# Patient Record
Sex: FEMALE | Race: BLACK OR AFRICAN AMERICAN | NOT HISPANIC OR LATINO | URBAN - METROPOLITAN AREA
[De-identification: names, ages, dates, MRNs, and addresses within clinical notes are randomized per-mention and may not be internally consistent; named-entity substitution may affect disease eponyms.]

---

## 2022-10-05 ENCOUNTER — TELEPHONE (OUTPATIENT)
Dept: FAMILY MEDICINE CLINIC | Facility: CLINIC | Age: 22
End: 2022-10-05

## 2022-10-05 NOTE — TELEPHONE ENCOUNTER
10/05/22      Called pt and No Answer  Left Detailed Message  If pt contacts office, please assist pt with confirming appt on 10/06/22 or if pt request to reschedule or cancel

## 2022-10-06 ENCOUNTER — OFFICE VISIT (OUTPATIENT)
Dept: FAMILY MEDICINE CLINIC | Facility: CLINIC | Age: 22
End: 2022-10-06

## 2022-10-06 VITALS
OXYGEN SATURATION: 99 % | HEART RATE: 83 BPM | TEMPERATURE: 96.6 F | BODY MASS INDEX: 24.11 KG/M2 | DIASTOLIC BLOOD PRESSURE: 72 MMHG | HEIGHT: 66 IN | SYSTOLIC BLOOD PRESSURE: 118 MMHG | RESPIRATION RATE: 16 BRPM | WEIGHT: 150 LBS

## 2022-10-06 DIAGNOSIS — Z11.59 ENCOUNTER FOR HEPATITIS C SCREENING TEST FOR LOW RISK PATIENT: ICD-10-CM

## 2022-10-06 DIAGNOSIS — Z11.4 ENCOUNTER FOR SCREENING FOR HIV: ICD-10-CM

## 2022-10-06 DIAGNOSIS — R73.03 PREDIABETES: ICD-10-CM

## 2022-10-06 DIAGNOSIS — Z23 ENCOUNTER FOR IMMUNIZATION: ICD-10-CM

## 2022-10-06 DIAGNOSIS — Z83.42 FAMILY HISTORY OF HIGH CHOLESTEROL: ICD-10-CM

## 2022-10-06 DIAGNOSIS — Z00.00 HEALTHCARE MAINTENANCE: ICD-10-CM

## 2022-10-06 DIAGNOSIS — Z00.00 ANNUAL PHYSICAL EXAM: Primary | ICD-10-CM

## 2022-10-06 DIAGNOSIS — Z83.3 FAMILY HISTORY OF DIABETES MELLITUS: ICD-10-CM

## 2022-10-06 LAB — SL AMB POCT HEMOGLOBIN AIC: 5.9 (ref ?–6.5)

## 2022-10-06 PROCEDURE — 99385 PREV VISIT NEW AGE 18-39: CPT | Performed by: FAMILY MEDICINE

## 2022-10-06 PROCEDURE — 90686 IIV4 VACC NO PRSV 0.5 ML IM: CPT | Performed by: FAMILY MEDICINE

## 2022-10-06 PROCEDURE — 83036 HEMOGLOBIN GLYCOSYLATED A1C: CPT | Performed by: FAMILY MEDICINE

## 2022-10-06 PROCEDURE — 90471 IMMUNIZATION ADMIN: CPT | Performed by: FAMILY MEDICINE

## 2022-10-06 NOTE — LETTER
October 6, 2022     Patient: Maryam Singletary  YOB: 2000  Date of Visit: 10/6/2022      To Whom it May Concern:    Maryam Singletary is under my professional care  Quentindeja Vinita was seen in my office on 10/6/2022  Wilianjayasaloni received the Flu vaccination today  If you have any questions or concerns, please don't hesitate to call           Sincerely,          Kali Randall MD        CC: No Recipients

## 2022-10-06 NOTE — ASSESSMENT & PLAN NOTE
· Flu shot given 10/6/2022  · Discussed cervical cancer screening and Pap smear starting at the age of 24  · Discussed risks/benefits of HPV vaccination  Advised patient will be a 3 vaccination series  She states that she will discuss with her family members and make a nursing visit appointment if interested in the future  · Do not have vaccination record on file however believe she is up-to-date on her tetanus vaccinations so will hold off at this time  · HIV/hepatitis-C blood test ordered    Advised to hold off on obtaining these tests at this time until she gets health insurance where these test will be covered  · Educated the importance of biannual dental examinations including  · Work note provided prove for influenza vaccination today

## 2022-10-06 NOTE — PATIENT INSTRUCTIONS

## 2022-10-06 NOTE — PROGRESS NOTES
[facility]  Annual Well Adult Visit      Assessment/Plan   Healthcare maintenance  · Flu shot given 10/6/2022  · Discussed cervical cancer screening and Pap smear starting at the age of 24  · Discussed risks/benefits of HPV vaccination  Advised patient will be a 3 vaccination series  She states that she will discuss with her family members and make a nursing visit appointment if interested in the future  · Do not have vaccination record on file however believe she is up-to-date on her tetanus vaccinations so will hold off at this time  · HIV/hepatitis-C blood test ordered  Advised to hold off on obtaining these tests at this time until she gets health insurance where these test will be covered  · Educated the importance of biannual dental examinations including  · Work note provided prove for influenza vaccination today    Prediabetes  · POCT A1c today 5 9  · Family history of diabetes on father's side  · Admits to diet heavy in rice and other carbohydrates  · Discussed exercise and portion control with food specifically carbohydrates as a way to keep A1c and sugars within normal range  · Did not discuss nor will start any medications at this time  Believe lifestyle changes in moderation will correct this A1c level to normal level very quickly  · Will order a lipid panel, TSH  · Educated the importance of a yearly eye exam  · Follow-up in 1 year or p r n         1  Healthy female exam   2  Patient Counseling:   · Nutrition: Stressed importance of a well balanced diet, moderation of sodium/saturated fat, caloric balance and sufficient intake of fiber  · Exercise: Stressed the importance of regular exercise with a goal of 150 minutes per week  · Dental Health: Discussed daily flossing and brushing and regular dental visits   · Immunizations reviewed  Flu, HPV  · Discussed benefits of screening Diaetes, Cholesterol, TSH  · Discussed the patient's BMI with her    The BMI is in the acceptable range  3  Cancer Screening-discussed cervical cancer screening   4  Labs-TSH, Lipid Panel, A1c,   5  Follow up in one year  Marry Grijalva is a 25 y o   female and is here for routine health maintenance  The patient reports no problems  History of Present Illness     This is a very pleasant 20-year-old female with no significant past medical history that presents to the clinic as a new patient for an annual physical   Patient recently moved from [place] to relocate to be closer to her family  She recently graduated college back in [place] and 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  She has no complaints at today's visit and would like a flu shot for work  Family history significant for diabetes on her father's side  Denies any surgical history  Denies any cigarette smoking, alcohol drinking, recreational drug use  Denies any current medication use her allergies  Well Adult Physical   Patient here for a comprehensive physical exam     Diet and Physical Activity  Diet: well balanced diet  Weight concerns: None, patient's BMI is between 18 5-24 9  Exercise: infrequently      Depression Screen  PHQ-2/9 Depression Screening    Little interest or pleasure in doing things: 0 - not at all  Feeling down, depressed, or hopeless: 0 - not at all  PHQ-2 Score: 0  PHQ-2 Interpretation: Negative depression screen       General Health  Hearing: Normal:  bilateral  Vision: no vision problems  Dental: regular dental visits and brushes teeth twice daily     History:  LMP: Patient's last menstrual period was 10/05/2022 (exact date)    : 0  Para: 0    Cancer Screening  Colononoscopy N/A  Mammogram N/A   Pap None on file    The following portions of the patient's history were reviewed and updated as appropriate: allergies, current medications, past family history, past medical history, past social history, past surgical history and problem list     Review of Systems     Review of Systems   Constitutional: Negative for chills, fatigue and fever  HENT: Negative for sore throat  Respiratory: Negative for cough, chest tightness and shortness of breath  Cardiovascular: Negative for chest pain and leg swelling  Gastrointestinal: Negative for constipation, diarrhea, nausea and vomiting  Endocrine: Negative for polydipsia, polyphagia and polyuria  Genitourinary: Negative for dysuria  Musculoskeletal: Negative for arthralgias  Skin: Negative for rash  Neurological: Negative for dizziness, light-headedness and headaches  Psychiatric/Behavioral: Negative for agitation and behavioral problems  Past Medical History     History reviewed  No pertinent past medical history  Past Surgical History     History reviewed  No pertinent surgical history  Social History     Social History     Socioeconomic History    Marital status: Unknown     Spouse name: None    Number of children: None    Years of education: None    Highest education level: None   Occupational History    None   Tobacco Use    Smoking status: Never Smoker    Smokeless tobacco: Never Used   Substance and Sexual Activity    Alcohol use: Never    Drug use: Never    Sexual activity: None   Other Topics Concern    None   Social History Narrative    None     Social Determinants of Health     Financial Resource Strain: Low Risk     Difficulty of Paying Living Expenses: Not hard at all   Food Insecurity: No Food Insecurity    Worried About Running Out of Food in the Last Year: Never true    [address] in the Last Year: Never true   Transportation Needs: No Transportation Needs    Lack of Transportation (Medical): No    Lack of Transportation (Non-Medical):  No   Physical Activity: Not on file   Stress: Not on file   Social Connections: Not on file   Intimate Partner Violence: Not on file   Housing Stability: Not on file       Family History     Family History Problem Relation Age of Onset    No Known Problems Mother     Hypertension Father     Diabetes Father        Current Medications     No current outpatient medications on file  Allergies     No Known Allergies    Objective     /72 (BP Location: Left arm, Patient Position: Sitting, Cuff Size: Standard)   Pulse 83   Temp (!) 96 6 °F (35 9 °C) (Temporal)   Resp 16   Ht 5' 6" (1 676 m)   Wt 68 kg (150 lb)   LMP 10/05/2022 (Exact Date)   SpO2 99%   Breastfeeding No   BMI 24 21 kg/m²      Physical Exam  Constitutional:       Appearance: She is well-developed  HENT:      Head: Normocephalic and atraumatic  Right Ear: Tympanic membrane and ear canal normal       Left Ear: Tympanic membrane and ear canal normal       Nose: Nose normal       Mouth/Throat:      Mouth: Mucous membranes are moist    Eyes:      Pupils: Pupils are equal, round, and reactive to light  Cardiovascular:      Rate and Rhythm: Normal rate and regular rhythm  Heart sounds: Normal heart sounds  No murmur heard  No friction rub  No gallop  Pulmonary:      Effort: Pulmonary effort is normal       Breath sounds: Normal breath sounds  No stridor  No wheezing or rales  Abdominal:      General: Bowel sounds are normal  There is no distension  Palpations: Abdomen is soft  Tenderness: There is no abdominal tenderness  There is no guarding  Musculoskeletal:         General: Normal range of motion  Cervical back: Normal range of motion and neck supple  Skin:     General: Skin is warm  Findings: No erythema or rash  Neurological:      Mental Status: She is alert and oriented to person, place, and time     Psychiatric:         Behavior: Behavior normal            No exam data present    Health Maintenance     Health Maintenance   Topic Date Due    Hepatitis C Screening  Never done    HPV Vaccine (1 - 2-dose series) Never done    HIV Screening  Never done    DTaP,Tdap,and Td Vaccines (1 - Tdap) Never done    Cervical Cancer Screening  Never done    Depression Screening  10/06/2023    BMI: Adult  10/06/2023    Annual Physical  10/06/2023    Influenza Vaccine  Completed    COVID-19 Vaccine  Completed    Pneumococcal Vaccine: Pediatrics (0 to 5 Years) and At-Risk Patients (6 to 59 Years)  Aged Out    HIB Vaccine  Aged Out    Hepatitis B Vaccine  Aged Out    IPV Vaccine  Aged Out    Hepatitis A Vaccine  Aged Out    Meningococcal ACWY Vaccine  Aged Out     Immunization History   Administered Date(s) Administered    COVID-19 PFIZER VACCINE 0 3 ML IM 06/17/2021, 07/14/2021, 01/20/2022    Influenza, injectable, quadrivalent, preservative free 0 5 mL 10/06/2022         Heavenly Bynum

## 2022-10-06 NOTE — ASSESSMENT & PLAN NOTE
· POCT A1c today 5 9  · Family history of diabetes on father's side  · Admits to diet heavy in rice and other carbohydrates  · Discussed exercise and portion control with food specifically carbohydrates as a way to keep A1c and sugars within normal range  · Did not discuss nor will start any medications at this time  Believe lifestyle changes in moderation will correct this A1c level to normal level very quickly  · Will order a lipid panel, TSH  · Educated the importance of a yearly eye exam  · Follow-up in 1 year or p r n

## 2022-12-05 PROBLEM — Z00.00 HEALTHCARE MAINTENANCE: Status: RESOLVED | Noted: 2022-10-06 | Resolved: 2022-12-05

## 2023-07-10 ENCOUNTER — TELEPHONE (OUTPATIENT)
Dept: FAMILY MEDICINE CLINIC | Facility: CLINIC | Age: 23
End: 2023-07-10

## 2023-07-10 NOTE — TELEPHONE ENCOUNTER
I've reached out to pt to set up yearly appt for 10/2023. Pt stated she's no longer living in the state of [place].